# Patient Record
Sex: MALE | URBAN - METROPOLITAN AREA
[De-identification: names, ages, dates, MRNs, and addresses within clinical notes are randomized per-mention and may not be internally consistent; named-entity substitution may affect disease eponyms.]

---

## 2024-05-22 ENCOUNTER — TRANSCRIBE ORDERS (OUTPATIENT)
Dept: PHYSICAL THERAPY | Facility: CLINIC | Age: 64
End: 2024-05-22

## 2024-05-22 DIAGNOSIS — M25.552 LEFT HIP PAIN: ICD-10-CM

## 2024-05-22 DIAGNOSIS — M54.59 OTHER LOW BACK PAIN: Primary | ICD-10-CM

## 2024-08-22 ENCOUNTER — HOSPITAL ENCOUNTER (EMERGENCY)
Facility: HOSPITAL | Age: 64
Discharge: HOME | End: 2024-08-22
Attending: STUDENT IN AN ORGANIZED HEALTH CARE EDUCATION/TRAINING PROGRAM
Payer: COMMERCIAL

## 2024-08-22 ENCOUNTER — APPOINTMENT (OUTPATIENT)
Dept: GENERAL RADIOLOGY | Facility: HOSPITAL | Age: 64
End: 2024-08-22
Payer: COMMERCIAL

## 2024-08-22 VITALS
SYSTOLIC BLOOD PRESSURE: 150 MMHG | HEIGHT: 71 IN | HEART RATE: 81 BPM | TEMPERATURE: 97.5 F | OXYGEN SATURATION: 94 % | WEIGHT: 230 LBS | RESPIRATION RATE: 18 BRPM | DIASTOLIC BLOOD PRESSURE: 90 MMHG | BODY MASS INDEX: 32.2 KG/M2

## 2024-08-22 DIAGNOSIS — M25.552 BILATERAL HIP PAIN: Primary | ICD-10-CM

## 2024-08-22 DIAGNOSIS — M25.551 BILATERAL HIP PAIN: Primary | ICD-10-CM

## 2024-08-22 PROCEDURE — 99283 EMERGENCY DEPT VISIT LOW MDM: CPT

## 2024-08-22 PROCEDURE — 25010000002 DEXAMETHASONE SODIUM PHOSPHATE 10 MG/ML SOLUTION: Performed by: NURSE PRACTITIONER

## 2024-08-22 PROCEDURE — 73522 X-RAY EXAM HIPS BI 3-4 VIEWS: CPT

## 2024-08-22 PROCEDURE — 25010000002 KETOROLAC TROMETHAMINE PER 15 MG: Performed by: NURSE PRACTITIONER

## 2024-08-22 PROCEDURE — 96372 THER/PROPH/DIAG INJ SC/IM: CPT

## 2024-08-22 RX ORDER — KETOROLAC TROMETHAMINE 30 MG/ML
30 INJECTION, SOLUTION INTRAMUSCULAR; INTRAVENOUS ONCE
Status: COMPLETED | OUTPATIENT
Start: 2024-08-22 | End: 2024-08-22

## 2024-08-22 RX ORDER — DEXAMETHASONE SODIUM PHOSPHATE 10 MG/ML
10 INJECTION, SOLUTION INTRAMUSCULAR; INTRAVENOUS ONCE
Status: COMPLETED | OUTPATIENT
Start: 2024-08-22 | End: 2024-08-22

## 2024-08-22 RX ORDER — ATORVASTATIN CALCIUM 20 MG/1
20 TABLET, FILM COATED ORAL EVERY EVENING
COMMUNITY
Start: 2024-05-18

## 2024-08-22 RX ADMIN — KETOROLAC TROMETHAMINE 30 MG: 30 INJECTION, SOLUTION INTRAMUSCULAR; INTRAVENOUS at 16:17

## 2024-08-22 RX ADMIN — DEXAMETHASONE SODIUM PHOSPHATE 10 MG: 10 INJECTION INTRAMUSCULAR; INTRAVENOUS at 16:19

## 2024-08-22 NOTE — ED PROVIDER NOTES
"Subjective:  History of Present Illness:    Patient is a 64-year-old male without contributing health history.  Presents to the ER today with bilateral hip pain.  Patient denies injury.  Reports that he had imaging completed on his hips previously but was told that he had mild degenerative issues.  Tried some NSAIDs but was not effective and helping his pain.  He denies OTC medication home remedy.  Denies alleviating or exacerbating factors    Nurses Notes reviewed and agree, including vitals, allergies, social history and prior medical history.     REVIEW OF SYSTEMS: All systems reviewed and not pertinent unless noted.  Review of Systems   Musculoskeletal:  Positive for arthralgias.   All other systems reviewed and are negative.      Past Medical History:   Diagnosis Date    Hyperlipidemia        Allergies:    Penicillins      History reviewed. No pertinent surgical history.      Social History     Socioeconomic History    Marital status:    Tobacco Use    Smoking status: Never   Substance and Sexual Activity    Alcohol use: Yes    Drug use: Never         History reviewed. No pertinent family history.    Objective  Physical Exam:  /90 (BP Location: Left arm, Patient Position: Sitting)   Pulse 81   Temp 97.5 °F (36.4 °C) (Oral)   Resp 18   Ht 180.3 cm (71\")   Wt 104 kg (230 lb)   SpO2 94%   BMI 32.08 kg/m²      Physical Exam  Vitals and nursing note reviewed.   Constitutional:       Appearance: Normal appearance. He is normal weight.   HENT:      Head: Normocephalic and atraumatic.      Nose: Nose normal.      Mouth/Throat:      Mouth: Mucous membranes are moist.      Pharynx: Oropharynx is clear.   Eyes:      Extraocular Movements: Extraocular movements intact.      Conjunctiva/sclera: Conjunctivae normal.      Pupils: Pupils are equal, round, and reactive to light.   Cardiovascular:      Rate and Rhythm: Normal rate and regular rhythm.      Pulses: Normal pulses.      Heart sounds: Normal " heart sounds.   Pulmonary:      Effort: Pulmonary effort is normal.      Breath sounds: Normal breath sounds.   Abdominal:      General: Abdomen is flat. Bowel sounds are normal.      Palpations: Abdomen is soft.   Musculoskeletal:         General: Normal range of motion.      Cervical back: Normal range of motion and neck supple.   Skin:     General: Skin is warm and dry.      Capillary Refill: Capillary refill takes less than 2 seconds.   Neurological:      General: No focal deficit present.      Mental Status: He is alert and oriented to person, place, and time. Mental status is at baseline.   Psychiatric:         Mood and Affect: Mood normal.         Behavior: Behavior normal.         Thought Content: Thought content normal.         Judgment: Judgment normal.         Procedures    ED Course:    ED Course as of 08/22/24 1722   Thu Aug 22, 2024   1701 Plain films independently interpreted by me showing no concern for acute fracture [JE]      ED Course User Index  [JE] Josue Marquis MD       Lab Results (last 24 hours)       ** No results found for the last 24 hours. **             XR Hips Bilateral With or Without Pelvis 3-4 View    Result Date: 8/22/2024  PROCEDURE: XR HIPS BILATERAL W OR WO PELVIS 3-4 VIEW-  HISTORY: Bilateral hip pain  COMPARISON: None.  FINDINGS:  A two view exam demonstrates no acute fracture or dislocation. The joint spaces demonstrate mild narrowing of the hip joints bilaterally consistent with mild degenerative change. No soft tissue abnormality is seen.      Impression: No acute bony abnormality.      This report was signed and finalized on 8/22/2024 4:29 PM by Marifer Quintero MD.          MDM      Initial impression of presenting illness: Patient is a 64-year-old male without contributing health history.  Presents to the ER today with bilateral hip pain.  Patient denies injury.  Reports that he had imaging completed on his hips previously but was told that he had mild degenerative  issues.  Tried some NSAIDs but was not effective and helping his pain.  He denies OTC medication home remedy.  Denies alleviating or exacerbating factors    DDX: includes but is not limited to: Arthritis, strain, sprain, or other    Patient arrives stable with vitals interpreted by myself.     Pertinent features from physical exam: Unremarkable.    Initial diagnostic plan: X-ray of bilateral hip    Results from initial plan were reviewed and interpreted by me revealing x-ray of bilateral hips with following impression no acute bony abnormality.  Mild degenerative change noted.    Diagnostic information from other sources: Chart review    Interventions / Re-evaluation: Vital signs stable throughout encounter.  Patient received 10 mg dexamethasone.  30 mg Toradol.    Results/clinical rationale were discussed with patient    Consultations/Discussion of results with other physicians: N/A    Disposition plan: Patient follow-up with PCP in 1 week.  Follow-up ER for new or worsening symptoms.  Follow-up with orthopedist in 1 week.  Follow-up ER for new or worse symptoms.  -----        Final diagnoses:   Bilateral hip pain          Deejay King, APRN  08/22/24 4831

## 2025-01-09 ENCOUNTER — HOSPITAL ENCOUNTER (EMERGENCY)
Facility: HOSPITAL | Age: 65
Discharge: HOME | End: 2025-01-09
Attending: STUDENT IN AN ORGANIZED HEALTH CARE EDUCATION/TRAINING PROGRAM
Payer: MEDICAID

## 2025-01-09 VITALS
HEART RATE: 92 BPM | OXYGEN SATURATION: 96 % | HEIGHT: 71 IN | BODY MASS INDEX: 30.8 KG/M2 | DIASTOLIC BLOOD PRESSURE: 106 MMHG | RESPIRATION RATE: 18 BRPM | WEIGHT: 220 LBS | SYSTOLIC BLOOD PRESSURE: 165 MMHG | TEMPERATURE: 97.5 F

## 2025-01-09 DIAGNOSIS — F10.920 ALCOHOLIC INTOXICATION WITHOUT COMPLICATION: Primary | ICD-10-CM

## 2025-01-09 DIAGNOSIS — R45.851 PASSIVE SUICIDAL IDEATIONS: ICD-10-CM

## 2025-01-09 LAB
ALBUMIN SERPL-MCNC: 4.4 G/DL (ref 3.5–5.2)
ALBUMIN/GLOB SERPL: 1.3 G/DL
ALP SERPL-CCNC: 98 U/L (ref 39–117)
ALT SERPL W P-5'-P-CCNC: 33 U/L (ref 1–41)
AMPHET+METHAMPHET UR QL: NEGATIVE
AMPHETAMINES UR QL: NEGATIVE
ANION GAP SERPL CALCULATED.3IONS-SCNC: 14.3 MMOL/L (ref 5–15)
APAP SERPL-MCNC: <5 MCG/ML (ref 0–30)
AST SERPL-CCNC: 33 U/L (ref 1–40)
BARBITURATES UR QL SCN: NEGATIVE
BASOPHILS # BLD AUTO: 0.08 10*3/MM3 (ref 0–0.2)
BASOPHILS NFR BLD AUTO: 1.2 % (ref 0–1.5)
BENZODIAZ UR QL SCN: NEGATIVE
BILIRUB SERPL-MCNC: 0.3 MG/DL (ref 0–1.2)
BILIRUB UR QL STRIP: NEGATIVE
BUN SERPL-MCNC: 5 MG/DL (ref 8–23)
BUN/CREAT SERPL: 5.4 (ref 7–25)
BUPRENORPHINE SERPL-MCNC: NEGATIVE NG/ML
CALCIUM SPEC-SCNC: 9.4 MG/DL (ref 8.6–10.5)
CANNABINOIDS SERPL QL: NEGATIVE
CHLORIDE SERPL-SCNC: 105 MMOL/L (ref 98–107)
CLARITY UR: CLEAR
CO2 SERPL-SCNC: 19.7 MMOL/L (ref 22–29)
COCAINE UR QL: NEGATIVE
COLOR UR: YELLOW
CREAT SERPL-MCNC: 0.92 MG/DL (ref 0.76–1.27)
DEPRECATED RDW RBC AUTO: 46.9 FL (ref 37–54)
EGFRCR SERPLBLD CKD-EPI 2021: 92.9 ML/MIN/1.73
EOSINOPHIL # BLD AUTO: 0.4 10*3/MM3 (ref 0–0.4)
EOSINOPHIL NFR BLD AUTO: 5.8 % (ref 0.3–6.2)
ERYTHROCYTE [DISTWIDTH] IN BLOOD BY AUTOMATED COUNT: 13.3 % (ref 12.3–15.4)
ETHANOL BLD-MCNC: 119 MG/DL (ref 0–10)
ETHANOL BLD-MCNC: 139 MG/DL (ref 0–10)
ETHANOL UR QL: 0.12 %
ETHANOL UR QL: 0.14 %
FENTANYL UR-MCNC: NEGATIVE NG/ML
GLOBULIN UR ELPH-MCNC: 3.5 GM/DL
GLUCOSE SERPL-MCNC: 93 MG/DL (ref 65–99)
GLUCOSE UR STRIP-MCNC: NEGATIVE MG/DL
HCT VFR BLD AUTO: 49.4 % (ref 37.5–51)
HGB BLD-MCNC: 16.8 G/DL (ref 13–17.7)
HGB UR QL STRIP.AUTO: NEGATIVE
HOLD SPECIMEN: NORMAL
HOLD SPECIMEN: NORMAL
IMM GRANULOCYTES # BLD AUTO: 0.02 10*3/MM3 (ref 0–0.05)
IMM GRANULOCYTES NFR BLD AUTO: 0.3 % (ref 0–0.5)
KETONES UR QL STRIP: NEGATIVE
LEUKOCYTE ESTERASE UR QL STRIP.AUTO: NEGATIVE
LYMPHOCYTES # BLD AUTO: 2.78 10*3/MM3 (ref 0.7–3.1)
LYMPHOCYTES NFR BLD AUTO: 40.5 % (ref 19.6–45.3)
MAGNESIUM SERPL-MCNC: 1.9 MG/DL (ref 1.6–2.4)
MCH RBC QN AUTO: 32.1 PG (ref 26.6–33)
MCHC RBC AUTO-ENTMCNC: 34 G/DL (ref 31.5–35.7)
MCV RBC AUTO: 94.5 FL (ref 79–97)
METHADONE UR QL SCN: NEGATIVE
MONOCYTES # BLD AUTO: 0.63 10*3/MM3 (ref 0.1–0.9)
MONOCYTES NFR BLD AUTO: 9.2 % (ref 5–12)
NEUTROPHILS NFR BLD AUTO: 2.96 10*3/MM3 (ref 1.7–7)
NEUTROPHILS NFR BLD AUTO: 43 % (ref 42.7–76)
NITRITE UR QL STRIP: NEGATIVE
NRBC BLD AUTO-RTO: 0 /100 WBC (ref 0–0.2)
OPIATES UR QL: NEGATIVE
OXYCODONE UR QL SCN: NEGATIVE
PCP UR QL SCN: NEGATIVE
PH UR STRIP.AUTO: <=5 [PH] (ref 5–8)
PLATELET # BLD AUTO: 343 10*3/MM3 (ref 140–450)
PMV BLD AUTO: 8.4 FL (ref 6–12)
POTASSIUM SERPL-SCNC: 4.3 MMOL/L (ref 3.5–5.2)
PROT SERPL-MCNC: 7.9 G/DL (ref 6–8.5)
PROT UR QL STRIP: NEGATIVE
RBC # BLD AUTO: 5.23 10*6/MM3 (ref 4.14–5.8)
SALICYLATES SERPL-MCNC: <0.3 MG/DL
SODIUM SERPL-SCNC: 139 MMOL/L (ref 136–145)
SP GR UR STRIP: <=1.005 (ref 1–1.03)
TRICYCLICS UR QL SCN: NEGATIVE
TSH SERPL DL<=0.05 MIU/L-ACNC: 2.03 UIU/ML (ref 0.27–4.2)
UROBILINOGEN UR QL STRIP: NORMAL
WBC NRBC COR # BLD AUTO: 6.87 10*3/MM3 (ref 3.4–10.8)
WHOLE BLOOD HOLD COAG: NORMAL
WHOLE BLOOD HOLD SPECIMEN: NORMAL

## 2025-01-09 PROCEDURE — 81003 URINALYSIS AUTO W/O SCOPE: CPT | Performed by: STUDENT IN AN ORGANIZED HEALTH CARE EDUCATION/TRAINING PROGRAM

## 2025-01-09 PROCEDURE — 82077 ASSAY SPEC XCP UR&BREATH IA: CPT | Performed by: STUDENT IN AN ORGANIZED HEALTH CARE EDUCATION/TRAINING PROGRAM

## 2025-01-09 PROCEDURE — 93005 ELECTROCARDIOGRAM TRACING: CPT | Performed by: STUDENT IN AN ORGANIZED HEALTH CARE EDUCATION/TRAINING PROGRAM

## 2025-01-09 PROCEDURE — 80053 COMPREHEN METABOLIC PANEL: CPT | Performed by: STUDENT IN AN ORGANIZED HEALTH CARE EDUCATION/TRAINING PROGRAM

## 2025-01-09 PROCEDURE — 80143 DRUG ASSAY ACETAMINOPHEN: CPT | Performed by: STUDENT IN AN ORGANIZED HEALTH CARE EDUCATION/TRAINING PROGRAM

## 2025-01-09 PROCEDURE — 82077 ASSAY SPEC XCP UR&BREATH IA: CPT

## 2025-01-09 PROCEDURE — 80179 DRUG ASSAY SALICYLATE: CPT | Performed by: STUDENT IN AN ORGANIZED HEALTH CARE EDUCATION/TRAINING PROGRAM

## 2025-01-09 PROCEDURE — 84443 ASSAY THYROID STIM HORMONE: CPT | Performed by: STUDENT IN AN ORGANIZED HEALTH CARE EDUCATION/TRAINING PROGRAM

## 2025-01-09 PROCEDURE — 99285 EMERGENCY DEPT VISIT HI MDM: CPT | Performed by: STUDENT IN AN ORGANIZED HEALTH CARE EDUCATION/TRAINING PROGRAM

## 2025-01-09 PROCEDURE — 85025 COMPLETE CBC W/AUTO DIFF WBC: CPT | Performed by: STUDENT IN AN ORGANIZED HEALTH CARE EDUCATION/TRAINING PROGRAM

## 2025-01-09 PROCEDURE — 83735 ASSAY OF MAGNESIUM: CPT | Performed by: STUDENT IN AN ORGANIZED HEALTH CARE EDUCATION/TRAINING PROGRAM

## 2025-01-09 PROCEDURE — 80307 DRUG TEST PRSMV CHEM ANLYZR: CPT | Performed by: STUDENT IN AN ORGANIZED HEALTH CARE EDUCATION/TRAINING PROGRAM

## 2025-01-09 PROCEDURE — 36415 COLL VENOUS BLD VENIPUNCTURE: CPT

## 2025-01-09 NOTE — ED PROVIDER NOTES
" EMERGENCY DEPARTMENT ENCOUNTER    Pt Name: Bethel Velazquez  MRN: [id number]  Pt :   1960  Room Number:  CDU1/01  Date of encounter:  2025  PCP: Provider, No Known  ED Provider: Elkin Barnes PA-C    Historian: patient, nursing notes      HPI:  Chief Complaint: Alcohol intoxication, SI        Context: Bethel Velazquez is a 64 y.o. male who presents to the ED c/o suicidal thoughts over the past 1 week.  Patient states he is also been drinking alcohol daily to try to help himself sleep.  Patient states he does not have a direct plan of how he would commit suicide and states emphatically that he \"would never really do that to myself\".  Patient just states that he has been exceedingly depressed as he got  recently moved to this area and his son  2 years ago.      PAST MEDICAL HISTORY  Past Medical History:   Diagnosis Date    Hyperlipidemia          PAST SURGICAL HISTORY  History reviewed. No pertinent surgical history.      FAMILY HISTORY  History reviewed. No pertinent family history.      SOCIAL HISTORY  Social History     Socioeconomic History    Marital status:    Tobacco Use    Smoking status: Never   Substance and Sexual Activity    Alcohol use: Yes     Alcohol/week: 50.0 standard drinks of alcohol     Types: 50 Glasses of wine per week     Comment: drinks 6-12 beers daily    Drug use: Never         ALLERGIES  Penicillins        REVIEW OF SYSTEMS  Review of Systems   Constitutional:  Negative for chills and fever.   HENT:  Negative for congestion and sore throat.    Respiratory:  Negative for cough and shortness of breath.    Cardiovascular:  Negative for chest pain.   Gastrointestinal:  Negative for abdominal pain, nausea and vomiting.   Genitourinary:  Negative for dysuria.   Musculoskeletal:  Negative for back pain.   Skin:  Negative for wound.   Neurological:  Negative for dizziness and headaches.   Psychiatric/Behavioral:  Positive for sleep disturbance and suicidal ideas. " Negative for confusion. The patient is nervous/anxious.    All other systems reviewed and are negative.         All systems reviewed and negative except for those discussed in HPI.       PHYSICAL EXAM    I have reviewed the triage vital signs and nursing notes.    ED Triage Vitals [01/09/25 1702]   Temp Heart Rate Resp BP SpO2   97.5 °F (36.4 °C) 90 19 (!) 151/105 92 %      Temp src Heart Rate Source Patient Position BP Location FiO2 (%)   -- -- -- -- --       Physical Exam  Vitals and nursing note reviewed.   Constitutional:       General: He is not in acute distress.     Appearance: Normal appearance. He is not ill-appearing, toxic-appearing or diaphoretic.   HENT:      Head: Normocephalic and atraumatic.      Right Ear: External ear normal.      Left Ear: External ear normal.      Nose: No congestion or rhinorrhea.      Mouth/Throat:      Mouth: Mucous membranes are moist.      Pharynx: Oropharynx is clear.   Eyes:      Conjunctiva/sclera: Conjunctivae normal.   Cardiovascular:      Rate and Rhythm: Normal rate.      Heart sounds: Normal heart sounds.   Pulmonary:      Effort: Pulmonary effort is normal. No respiratory distress.      Breath sounds: Normal breath sounds. No wheezing.   Abdominal:      Tenderness: There is no abdominal tenderness.   Musculoskeletal:      Cervical back: Normal range of motion and neck supple.      Right lower leg: No edema.      Left lower leg: No edema.   Skin:     Findings: No rash.   Neurological:      Mental Status: He is alert.   Psychiatric:         Thought Content: Thought content includes suicidal ideation. Thought content does not include homicidal ideation. Thought content does not include homicidal or suicidal plan.             LAB RESULTS  Recent Results (from the past 24 hours)   Comprehensive Metabolic Panel    Collection Time: 01/09/25  5:23 PM    Specimen: Blood   Result Value Ref Range    Glucose 93 65 - 99 mg/dL    BUN 5 (L) 8 - 23 mg/dL    Creatinine 0.92 0.76 -  1.27 mg/dL    Sodium 139 136 - 145 mmol/L    Potassium 4.3 3.5 - 5.2 mmol/L    Chloride 105 98 - 107 mmol/L    CO2 19.7 (L) 22.0 - 29.0 mmol/L    Calcium 9.4 8.6 - 10.5 mg/dL    Total Protein 7.9 6.0 - 8.5 g/dL    Albumin 4.4 3.5 - 5.2 g/dL    ALT (SGPT) 33 1 - 41 U/L    AST (SGOT) 33 1 - 40 U/L    Alkaline Phosphatase 98 39 - 117 U/L    Total Bilirubin 0.3 0.0 - 1.2 mg/dL    Globulin 3.5 gm/dL    A/G Ratio 1.3 g/dL    BUN/Creatinine Ratio 5.4 (L) 7.0 - 25.0    Anion Gap 14.3 5.0 - 15.0 mmol/L    eGFR 92.9 >60.0 mL/min/1.73   Magnesium    Collection Time: 01/09/25  5:23 PM    Specimen: Blood   Result Value Ref Range    Magnesium 1.9 1.6 - 2.4 mg/dL   Acetaminophen Level    Collection Time: 01/09/25  5:23 PM    Specimen: Blood   Result Value Ref Range    Acetaminophen <5.0 0.0 - 30.0 mcg/mL   Ethanol    Collection Time: 01/09/25  5:23 PM    Specimen: Blood   Result Value Ref Range    Ethanol 139 (H) 0 - 10 mg/dL    Ethanol % 0.139 %   Salicylate Level    Collection Time: 01/09/25  5:23 PM    Specimen: Blood   Result Value Ref Range    Salicylate <0.3 <=30.0 mg/dL   Urinalysis With Microscopic If Indicated (No Culture) - Urine, Clean Catch    Collection Time: 01/09/25  5:23 PM    Specimen: Urine, Clean Catch   Result Value Ref Range    Color, UA Yellow Yellow, Straw    Appearance, UA Clear Clear    pH, UA <=5.0 5.0 - 8.0    Specific Gravity, UA <=1.005 1.005 - 1.030    Glucose, UA Negative Negative    Ketones, UA Negative Negative    Bilirubin, UA Negative Negative    Blood, UA Negative Negative    Protein, UA Negative Negative    Leuk Esterase, UA Negative Negative    Nitrite, UA Negative Negative    Urobilinogen, UA 0.2 E.U./dL 0.2 - 1.0 E.U./dL   Urine Drug Screen - Urine, Clean Catch    Collection Time: 01/09/25  5:23 PM    Specimen: Urine, Clean Catch   Result Value Ref Range    THC, Screen, Urine Negative Negative    Phencyclidine (PCP), Urine Negative Negative    Cocaine Screen, Urine Negative Negative     Methamphetamine, Ur Negative Negative    Opiate Screen Negative Negative    Amphetamine Screen, Urine Negative Negative    Benzodiazepine Screen, Urine Negative Negative    Tricyclic Antidepressants Screen Negative Negative    Methadone Screen, Urine Negative Negative    Barbiturates Screen, Urine Negative Negative    Oxycodone Screen, Urine Negative Negative    Buprenorphine, Screen, Urine Negative Negative   TSH    Collection Time: 01/09/25  5:23 PM    Specimen: Blood   Result Value Ref Range    TSH 2.030 0.270 - 4.200 uIU/mL   Green Top (Gel)    Collection Time: 01/09/25  5:23 PM   Result Value Ref Range    Extra Tube Hold for add-ons.    Lavender Top    Collection Time: 01/09/25  5:23 PM   Result Value Ref Range    Extra Tube hold for add-on    Gold Top - SST    Collection Time: 01/09/25  5:23 PM   Result Value Ref Range    Extra Tube Hold for add-ons.    Light Blue Top    Collection Time: 01/09/25  5:23 PM   Result Value Ref Range    Extra Tube Hold for add-ons.    CBC Auto Differential    Collection Time: 01/09/25  5:23 PM    Specimen: Blood   Result Value Ref Range    WBC 6.87 3.40 - 10.80 10*3/mm3    RBC 5.23 4.14 - 5.80 10*6/mm3    Hemoglobin 16.8 13.0 - 17.7 g/dL    Hematocrit 49.4 37.5 - 51.0 %    MCV 94.5 79.0 - 97.0 fL    MCH 32.1 26.6 - 33.0 pg    MCHC 34.0 31.5 - 35.7 g/dL    RDW 13.3 12.3 - 15.4 %    RDW-SD 46.9 37.0 - 54.0 fl    MPV 8.4 6.0 - 12.0 fL    Platelets 343 140 - 450 10*3/mm3    Neutrophil % 43.0 42.7 - 76.0 %    Lymphocyte % 40.5 19.6 - 45.3 %    Monocyte % 9.2 5.0 - 12.0 %    Eosinophil % 5.8 0.3 - 6.2 %    Basophil % 1.2 0.0 - 1.5 %    Immature Grans % 0.3 0.0 - 0.5 %    Neutrophils, Absolute 2.96 1.70 - 7.00 10*3/mm3    Lymphocytes, Absolute 2.78 0.70 - 3.10 10*3/mm3    Monocytes, Absolute 0.63 0.10 - 0.90 10*3/mm3    Eosinophils, Absolute 0.40 0.00 - 0.40 10*3/mm3    Basophils, Absolute 0.08 0.00 - 0.20 10*3/mm3    Immature Grans, Absolute 0.02 0.00 - 0.05 10*3/mm3    nRBC 0.0 0.0 -  0.2 /100 WBC   Fentanyl, Urine - Urine, Clean Catch    Collection Time: 01/09/25  5:23 PM    Specimen: Urine, Clean Catch   Result Value Ref Range    Fentanyl, Urine Negative Negative   Ethanol    Collection Time: 01/09/25  6:55 PM    Specimen: Blood   Result Value Ref Range    Ethanol 119 (H) 0 - 10 mg/dL    Ethanol % 0.119 %       If labs were ordered, I independently reviewed the results and considered them in treating the patient.        RADIOLOGY  No Radiology Exams Resulted Within Past 24 Hours      PROCEDURES    Procedures    ECG 12 Lead Other; psych   Final Result          MEDICATIONS GIVEN IN ER    Medications - No data to display      MEDICAL DECISION MAKING, PROGRESS, and CONSULTS    All labs, if obtained, have been independently reviewed by me.  All radiology studies, if obtained, have been reviewed by me and the radiologist dictating the report.  All EKG's, if obtained, have been independently viewed and interpreted by me/my attending physician.      Discussion below represents my analysis of pertinent findings related to patient's condition, differential diagnosis, treatment plan and final disposition.  64-year-old male who presented to the ER for evaluation of reported suicidal ideation after he called a local hotline.  Today the patient appears to be mildly intoxicated but he is alert and oriented and his vitals and pulse oximetry show mild hypertension otherwise unremarkable.  My physical exam is  unremarkable.  Extensive laboratory workup EKG and urinalysis was ordered.    Laboratory studies were reassuring his ethanol initially tested was 139.  Ethanol retested later to be 119 and now behavioral health assessment was performed at bedside by Reji ANTONY.  Reji felt that the patient was stable for discharge as he was now emphatically saying he is not actively suicidal has no acute plan of suicide denies HI and would like to be discharged.  The patient now appears clinically sober and has a  ride home.  The professional opinion of Reji ANTONY that this patient was safe to be discharged with an action plan and close referral with psychiatrist plus the patient will be in contact with his own therapist tomorrow.  I therefore discharge the patient based on this advise home.                       Differential diagnosis:    Differential diagnosis included but was not limited to SI, anxiety, depression, alcohol intoxication      Additional sources:    - Discussed/ obtained information from independent historians: None    - External (non-ED) record review: Primary care records and previous medical records reviewed    - Chronic or social conditions impacting care: None    Orders placed during this visit:  Orders Placed This Encounter   Procedures    [place] Draw    Comprehensive Metabolic Panel    Magnesium    Acetaminophen Level    Ethanol    Salicylate Level    Urinalysis With Microscopic If Indicated (No Culture) - Urine, Clean Catch    Urine Drug Screen - Urine, Clean Catch    TSH    CBC Auto Differential    Fentanyl, Urine - Urine, Clean Catch    Ethanol    Vital Signs    Undress & Gown    Psych / Access to See    ECG 12 Lead Other; psych    Suicide Precautions    CBC & Differential    Green Top (Gel)    Lavender Top    Gold Top - SST    Light Blue Top         Additional orders considered but not ordered: None       ED Course:    Consultants:  behavioral health consultant Reji ANTONY    ED Course as of 01/09/25 2134   Thu Jan 09, 2025   1815 Ethanol(!): 139 [TG]      ED Course User Index  [TG] Elkin Barnes PA-C              Shared Decision Making:  After my consideration of clinical presentation and any laboratory/radiology studies obtained, I discussed the findings with the patient/patient representative who is in agreement with the treatment plan and the final disposition.   Risks and benefits of discharge and/or observation/admission were discussed.       AS OF 21:34 EST VITALS:    BP -  (!) 165/106  HR - 92  TEMP - 97.5 °F (36.4 °C)  O2 SATS - 96%                  DIAGNOSIS  Final diagnoses:   Alcoholic intoxication without complication   Passive suicidal ideations         DISPOSITION  Discharge home      Please note that portions of this document were completed with voice recognition software.      Elkin Barnes PA-C  01/09/25 [id number]

## 2025-01-10 NOTE — DISCHARGE INSTRUCTIONS
We recommend calling your therapist tomorrow and following up with the behavioral health resources that were provided to you today.  We recommend against drinking alcohol using any drugs and we also recommend you return to the ER immediately should you have increased or worsening depression symptoms, anxiety, or specially for any thoughts of wanting to hurt yourself.

## 2025-01-10 NOTE — CONSULTS
"Bethel Marcus  1960      Race/Ethnicity: White or   Martial Status:   Guardian Name/Contact/etc: Self  Pt Lives With:  Self  Occupation: retired  Appearance: Patient is dressed appropriately      Time Called for Assessment: 19:36  Assessment Start and End: 19:38-20:08      DATA:   Clinician received a call from [facility] staff for a behavioral health consult.  The patient is agreeable to speak with the behavioral health team.  Met with patient at bedside. Patient is under 1:1 security monitoring.  The attending treatment team is Elisabeth Chairez RN and Elkin Barnes PA-C, Provider.  Patient presents today with chief compliant of anxiety and depression.  Clinician completed assessment with patient and observations are documented as follows.    ASSESSMENT:    Clinician consulted with patient for mental status exam and assessment.  Clinical descriptors are documented as follows.  Clinician completed CSSRS with patient for suicide risk assessment.  The results of patient’s CSSRS documented as follows.    Presenting Problems: Patient reported that a week ago he contacted the [phone] number to be able to talk to someone and start \" getting help\" . Patient reported that they set him up with an appointment with a therapist in [place] (patient could not remember the name of the therapist or place). Patient reported that he went and saw the therapist and then was told he would have a Psychiatry appointment. Patient reported that appointment did not occur so he called the therapist back about it. Patient reported that the therapist started focusing on his alcohol use and patient got upset and told therapist he would handle it himself. Patient reported that he was sitting on his couch drinking a beer and [facility] showed up and he was brought to ER for evaluation. Therapist asked patient what he meant by \" I will handle it myself\" Patient reported, \" I will just get over and work on it\". Patient was " Report given to ADRIAN Resendiz.    convincingly adamant in denying suicidal thoughts, plans or intent. Patient reports that he is sad and anxious due to his recent separation with his wife of 34 years and continued grieving of his sons passing in 2022. Patient reported having supports in the area and protective factors. Patient reported he was just wanting to get services started.     Current Stressors: Marital Relationship, mental health , grief     Established Therapy, Medication Management or Other Mental Health Services: Started services with a place in [place] that was set up by [facility] but could not remember the name. Patient is wanting a referral to [facility] [place] for outpatient services.    Current Psychiatric Medications: None at this time       Mental Status Exam:  Behavior: Appropriate  Psychomotor Movement: Appropriate  Attention and Cooperation: Normal and Cooperative  Mood: appropriate to circumstances and Affect: Appropriate  Orientation: alert and oriented to person, place, and time   Thought Process: linear, logical, and goal directed  Thought Content: normal  Delusions: none   Hallucinations: None and Not demonstrated today   Concentration: Normal  Suicidal Ideation: Absent  Homicidal Ideation: Absent  Hopelessness: Mild  Speech: Normal  Eye Contact: Good  Insight: Good  Judgement: Good    Depression: 8   Anxiety: 8  Sleep: Fair   Appetite: Tolerating diet       Hx of Psychiatric or Detox Hospitalizations: None  Most recent inpatient admission: N/A    Suicidal Ideation Assessment:    [place]-SUICIDE SEVERITY RATING SCALE  Psychiatric Inpatient Setting - Discharge Screener    Ask questions that are bold and underlined Discharge   Ask Questions 1 and 2 YES NO   Wish to be Dead:   Person endorses thoughts about a wish to be dead or not alive anymore, or wish to fall asleep and not wake up.  While you were here in the hospital, have you wished you were dead or wished you could go to sleep and not wake up?  X   Suicidal Thoughts:   General  non-specific thoughts of wanting to end one's life/die by suicide, “I've thought about killing myself” without general thoughts of ways to kill oneself/associated methods, intent, or plan.   While you were here in the hospital, have you actually had thoughts about killing yourself?   X   If YES to 2, ask questions 3, 4, 5, and 6.  If NO to 2, go directly to question 6   3) Suicidal Thoughts with Method (without Specific Plan or Intent to Act):   Person endorses thoughts of suicide and has thought of a least one method during the assessment period. This is different than a specific plan with time, place or method details worked out. “I thought about taking an overdose but I never made a specific plan as to when where or how I would actually do it….and I would never go through with it.”   Have you been thinking about how you might kill yourself?      4) Suicidal Intent (without Specific Plan):   Active suicidal thoughts of killing oneself and patient reports having some intent to act on such thoughts, as opposed to “I have the thoughts but I definitely will not do anything about them.”   Have you had these thoughts and had some intention of acting on them or do you have some intention of acting on them after you leave the hospital?      5) Suicide Intent with Specific Plan:   Thoughts of killing oneself with details of plan fully or partially worked out and person has some intent to carry it out.   Have you started to work out or worked out the details of how to kill yourself either for while you were here in the hospital or for after you leave the hospital? Do you intend to carry out this plan?        6) Suicide Behavior    While you were here in the hospital, have you done anything, started to do anything, or prepared to do anything to end your life?    Examples: Took pills, cut yourself, tried to hang yourself, took out pills but didn't swallow any because you changed your mind or someone took them from you,  collected pills, secured a means of obtaining a gun, gave away valuables, wrote a will or suicide note, etc.  X     Suicidal: Patient was convincingly adamant in denying suicidal thoughts, plans or intent. Patient reports that he is sad and anxious due to his recent separation with his wife of 34 years and continued grieving of his sons passing in 2022. Patient reported having supports in the area and protective factors. Patient reported he was just wanting to get services started. Patient denies having any attempts or self injurious behaviors.  (If present, describe frequency of thoughts, patient's perception of impulse control, plan or behavior details, etc)  Previous Attempts: None  Most Recent Attempt: N/A    Psychosocial History  Family Hx of Mental Health/Substance Abuse: Patient did not disclose  Patient Trauma/Abuse History: Patient has trauma due to death of his son in 2022 . Patient did not disclose details.     Does this require reporting: No  Patient Identified Support System:  Sister in Law, Brother, Mother.     Legal History / History of Violence: Denies significant history of legal issues.   Experience with Interpersonal Violence: No  History of Inappropriate Sexual Behavior: No  Current Medical Conditions or Biomedical Complications: No     Social Determinants of Health  Housing Instability and/or Utility Needs: No  Food Insecurity: No  Transportation Needs: No    Substance Use History  Active Use: Patient does report actively drinking alcohol. Patient reported prior to moving to [place] he would only drink 6 beers on the weekend and sometimes 6 beers during the week. Patient reported for last 2 months he has been drinking 6-12 beers daily as a way to cope and go to sleep. Patient denies any other substance use.  UDS was negative, EtOH upon arrival 139 mg/dl at 19:28 119mg/dl. Patient was coherent alert and oriented.    Does the patient have history or current MAT/MOUD: No  Withdrawal Symptoms:  No  History of DT's: No  History of Seizures: No  Recovery Environment: None at this time          Clinical [place] Withdrawal Assessment of Alcohol Scale (CIWA)  Pulse or heart rate, taken for one minute: 92  Blood pressure:165/106    NAUSEA AND VOMITING--Ask “Do you fell sick to your stomach? Have you vomited?” Observatoin.  0 no nausea and no vomiting  1 mild nausea with no vomiting  2  3  4 intermittent nausea with dry heaves  5  6  7 constant nausea, frequent dry heaves and vomiting    TREMOR--Arms extended and fingers spread apart.  Observation  0 no tremor  1 not visible, but can be felt fingertip to fingertip  2  3  4 moderate, with patient's arms extended  5  6  7 severe, even with arms not extended    PAROXYSMAL SWEATS--Observation  0 no sweat visible  1 barely perceptible sweating, palms moist  2  3  4 beads of sweat obvious on forehead  5  6  7 drenching sweats    ANXIETY--Ask “Do you feel nervous?” Observation.  0 no anxiety, at ease  1 mild anxious  2  3  4 moderately anxious, or guarded, so anxiety is inferred  5  6  7 equivalent to acute panic states as seen in severe delirium or acute schizophrenic reactions    TACTILE DISTURBANCES--Ask “Have you any itching, pins and needles sensations, any burning, any numbness, or do you feel bugs crawling on or under your skin?” Observation.  0 none  1 very mild itching, pins and needles, burning or numbness  2 mild itching, pins and needles, burning or numbness  3 moderate itching, pins and needles, burning or numbness  4 moderately severe hallucinations  5 severe hallucinations  6 extremely severe hallucinations  7 continuous hallucinations  AUDITORY DISTURBANCES--Ask “Are you more aware of sounds around you ? Are they harsh? Do they frighten you?  Are you hearing anything that is disturbing to you?  Are you hearing things you know are not there? Observation.  0 not present  1 very mild harshness or ability to frighten  2 mild harshness or ability to  frighten  3 moderate harshness or ability to frighten  4 moderately severe hallucinations  5 severe hallucinations  6 extremely severe hallucinations  7 continuous hallucinations       VISUAL DISTURBANCES--Ask “Does the light appear to be too bright? Is its color different? Does it hurt your eyes?  Are you seeing anything that is disturbing to you? Are you seeing things you know are not there?' Observation  0 not present  1 very mild sensitivity  2 mild sensitivity  3 moderate sensitivity  4 moderately severe hallucinations  5 severe hallucinations  6 extremely severe hallucinations  7 continuous hallucinations    HEADACHE, FULLNESS IN HEAD--Ask “Does your head feel different? Does it feel like there is a band around your head?” Do not rate for dizziness or lightheadedness.  Otherwise, rate severity.  0 not present  1 very mild  2 mild  3 moderate  4 moderately severe  5 severe  6 very severe  7 extremely severe    AGITATION--Observation  0 normal activity  1 somewhat more than normal activity  2   3  4 moderately fidgety and restless  5  6  7 paces back and forth during most of the interview, or constantly thrashes about    ORIENTATION AND CLOUDING OF SENSORIUM--Ask   “What day is this? Where are you? Who am I?  0 oriented and can do serial additions  1 cannot do serial additions or is uncertain about date  2 disoriented for date by no more than 2 calendar days  3 disoriented for date by more than 2 calendar days  4 disoriented for place/or person    Total CIWA-Ar Score: 1: Anxiety   Rater's Initials: RENNY      PLAN:  At this time, clinician recommends for outpatient services. Although the patient has potential to benefit from the inpatient level of care, the patient is not agreeable for an acute admission at this time.  Patient does not present with criteria to warrant an involuntary psychiatric hold at this time as they are not endorsing active suicidal ideation with plan/intent, homicidal ideation with plan/intent  or displaying symptoms of an acute psychotic episode without ability to appropriately plan for safety at a lesser restrictive level of care.  The patient identified proactive factors and is agreeable to establish/engage in outpatient behavioral health services. Therapist discussed plan with [facility] Treatment team and team is agreeable for outpatient service. Therapist and patient discussed plan for outpatient services and patient is agreeable for referral to be sent to [facility]. Therapist and patient completed safety plan and patient was provided resources.    Have the levels of care been discussed with the patient? Yes  Level of care recommendation: outpatient  Is patient agreeable to treatment? Yes    Safety Planning:  Does the patient have access to weapons or firearms? No  Did clinician discuss securing weapons, firearms, sharps and/or medications with the patient? Yes  Safety Plan: Clinician verbally engaged in safety planning by assisting the patient in identifying risk factors that would indicate the need for higher level of care, such as thoughts to harm self or others and/or self-harming behavior(s). Safety plan of report to nearest hospital, calling local police or 911 if feeling unsafe, if having suicidal or homicidal thoughts, or if in emergent need of medications verbally reviewed with patient during assessment and suicide prevention/crisis hotlines verbally reviewed with patient during assessment. Patient during assessment verbally agreed to safety plan. Reviewed resources of crisis hotlines or presenting to the nearest emergency department should symptoms worsen, or in any crisis/emergency. Patient agreeable and voiced understanding.       SIGNATURE  Reji Mahoney [facility]  01/09/2025

## 2025-01-14 ENCOUNTER — OFFICE VISIT (OUTPATIENT)
Dept: PSYCHIATRY | Facility: CLINIC | Age: 65
End: 2025-01-14

## 2025-01-14 VITALS
HEART RATE: 78 BPM | BODY MASS INDEX: 34.72 KG/M2 | WEIGHT: 248 LBS | SYSTOLIC BLOOD PRESSURE: 140 MMHG | HEIGHT: 71 IN | DIASTOLIC BLOOD PRESSURE: 88 MMHG | OXYGEN SATURATION: 99 %

## 2025-01-14 DIAGNOSIS — F33.2 SEVERE EPISODE OF RECURRENT MAJOR DEPRESSIVE DISORDER, WITHOUT PSYCHOTIC FEATURES: ICD-10-CM

## 2025-01-14 DIAGNOSIS — F41.1 GENERALIZED ANXIETY DISORDER: ICD-10-CM

## 2025-01-14 DIAGNOSIS — F10.20 ALCOHOL USE DISORDER, SEVERE, DEPENDENCE: ICD-10-CM

## 2025-01-14 DIAGNOSIS — F41.1 GENERALIZED ANXIETY DISORDER: Primary | ICD-10-CM

## 2025-01-14 RX ORDER — NALTREXONE HYDROCHLORIDE 50 MG/1
50 TABLET, FILM COATED ORAL DAILY
Qty: 30 TABLET | Refills: 0 | Status: SHIPPED | OUTPATIENT
Start: 2025-01-14

## 2025-01-14 RX ORDER — NALTREXONE HYDROCHLORIDE 50 MG/1
50 TABLET, FILM COATED ORAL DAILY
Qty: 30 TABLET | Refills: 0 | Status: SHIPPED | OUTPATIENT
Start: 2025-01-14 | End: 2025-01-14 | Stop reason: SDUPTHER

## 2025-01-14 NOTE — PROGRESS NOTES
New Patient Office Visit        Patient Name: Bethel Velazquez  : 1960   MRN: [id number]     Referring Provider: Provider, No Known    Chief Complaint: Substance use and mental health    History of Present Illness:   Bethel Velazquez is a 64 y.o. male who is here today for initial evaluation with provider related to substance use and mental health.  Alcohol use and initially started in high school with social and seldom use.  After the death of his son in , use began to increase and be more regular.  Continued to increase over time and reached his peak 2024.  Was drinking 12 pack of beer most to all nights of the week.  Cut back some in recent past and has been drinking 6-12 beers most nights a week.  Brought to ER on 2025 by [employer] as he had made some concerning statements [facility].  Has not had any alcohol intake since.  Denies any other substance use.  Cravings for alcohol are multiple times daily.  Also having dreams about drinking at night.  Typically triggered by habit, hard emotions, and grief.  Has been in AA in the past.  No other previous FLORES/MAT. Identifies sober support system of his mother, brother, and sister-in-law.      Denies psych history.  Feels he has been struggling with mood off and on for at least the last few years.  Today reports symptoms of depressed mood overall, lack of motivation/interest, feelings of guilt, low energy, poor concentration, appetites waxes and wanes, sleep difficulties, and psychomotor retardation at times. Admits thoughts that he would be better off dead or wishes he doesn't wake up. Denies any plan or intent to harm himself. Also struggles with anxious mood overall, worries all the time, feels on edge/irritable, racing thoughts, trouble relaxing, and restlessness.  Finds most problematic symptom to be crying spells.  No previous pharmacological intervention in the past.  Previously enjoyed working on cars and racing go carts.  Has not been able to engage  in these hobbies since the death of his son. Denies prior psychiatric hospitalizations. Denies SI attempts, current or history of plan or intent to harm himself.  Denies HI, AVH. +FH of AUD in son who passed away due to alcohol related health issues.     Denies significant PMH. Currently does not have a PCP.  Denies Hep C/HIV.  Denies DT/seizure history.    Currently lives in alone in [place]. Moved back to the area from [place] when he and his wife  5/2024. He would like to repair the relationship. Has 1 living child and granddaughter that are in [place]. Son passed away at age 36. Highest level of education completed was some college. Not currently employed. 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 Thinking about getting part-time job when he get mental health issues taken care of. License is active and has a vehicle. Denies current legal issues related to use. DUI in 2018. Currently does not have health insurance. Does not qualify for medicaid due to income. Will be able to get on medicare 8/2025.    Triggers: Habit, hard emotions, grief    Cravings: Multiple times daily for alcohol.  Using dreams as well.    Relapse Prevention: MAT for AUD, psych med management, peer support, recovery meetings    Urine Drug Screen (today's visit) discussed: 1/9/2025 positive ethanol    UDS Confirmation: N/a    JAVED (PDMP) Reviewed for Current/Active Medications: No active medications    DSM 5 Alcohol Use Disorder Checklist     Diagnostic Criteria   (Substance use disorder requires at least 2 criteria be met within 12 month period)   Meets Criteria          Yes / No  Notes/Supporting Information    Substance often taken in larger amounts or over a longer period than intended.  yes    2.  There is a persistent desire or unsuccessful effort to cut        down or control th substance use.  yes    3.  A great deal of time is spent in activities necessary to        obtain the substance, use the substance, or  recover        from its effects.  yes    4.  Craving or a strong desire to use the substance.  yes    5.  Recurrent substance use resulting in failure to fulfill        major role obligations at work, school, or home.  yes    6.  Continued substance use despite having persistent or         recurrent social or interpersonal problems caused or         exacerbated by the effects of the substance.  yes    7.   Important social, occupational or recreational activities        are given up or reduced because of substance use.  yes    8.   Recurrent substance use in situations in which it is        physically hazardous.  no    9.  Continued use despite knowledge of having a         persistent or recurrent physical or psychological         problem that is likely to have been caused or        exacerbated by the substance.  yes    10. * Tolerance, as defined by either of the following:          a. A need for markedly increased amounts of the              Substance to achieve intoxication or desired effect.          b. Markedly diminished effect with continued use of              The same substance.  yes    11.  * Withdrawal, as manifested by either of the following:         a. The characteristic withdrawal for the substance.          b. The same (or closely related) substance is taken to               Relieve or avoid withdrawal symptoms.  no    **This criterion is not considered to be met for those individuals taking prescriptions opiates solely under medical supervision. **   Severity: Mild: 2-3 symptoms, Moderate: 4-5 symptoms, Severe: 6 or more symptoms.          Past Surgical History:  History reviewed. No pertinent surgical history.    Problem List:  There is no problem list on file for this patient.      Allergy:   Allergies   Allergen Reactions    Penicillins Swelling and Other (See Comments)     Unknown reaction        Current Medications:   Current Outpatient Medications   Medication Sig Dispense Refill    FLUoxetine  (PROzac) 20 MG capsule Take 1 capsule by mouth Daily. 30 capsule 0    naltrexone (DEPADE) 50 MG tablet Take 1 tablet by mouth Daily. 30 tablet 0     No current facility-administered medications for this visit.       Past Medical History:  Past Medical History:   Diagnosis Date    Hyperlipidemia        Social History:  Social History     Socioeconomic History    Marital status:    Tobacco Use    Smoking status: Never   Vaping Use    Vaping status: Never Used   Substance and Sexual Activity    Alcohol use: Not Currently     Alcohol/week: 50.0 standard drinks of alcohol     Types: 50 Glasses of wine per week     Comment: Last thursday stopped-drinks 6-12 beers daily    Drug use: Never    Sexual activity: Defer       Family History:  History reviewed. No pertinent family history.      Subjective      Review of Systems:   Review of Systems   Constitutional:  Positive for fatigue. Negative for chills and fever.   Respiratory:  Negative for shortness of breath.    Cardiovascular:  Negative for chest pain.   Gastrointestinal:  Negative for abdominal pain.   Skin:  Negative for skin lesions.   Neurological:  Negative for seizures and confusion.   Psychiatric/Behavioral:  Positive for decreased concentration, sleep disturbance, depressed mood and stress. Negative for hallucinations and suicidal ideas. The patient is nervous/anxious.        PHQ-9 Depression Screening  Little interest or pleasure in doing things? Almost all   Feeling down, depressed, or hopeless? Almost all   Trouble falling or staying asleep, or sleeping too much? Almost all   Feeling tired or having little energy? Almost all   Poor appetite or overeating? Over half   Feeling bad about yourself - or that you are a failure or have let yourself or your family down? Almost all   Trouble concentrating on things, such as reading the newspaper or watching television? Over half   Moving or speaking so slowly that other people could have noticed? Or the  opposite - being so fidgety or restless that you have been moving around a lot more than usual? Over half   Thoughts that you would be better off dead, or of hurting yourself in some way? Over half   PHQ-9 Total Score 23   If you checked off any problems, how difficult have these problems made it for you to do your work, take care of things at home, or get along with other people? Somewhat difficult      [place] Suicide Severity Rating (C-SSRS)  In the past month, have you wished you were dead or wished you could go to sleep and not wake up? Yes     In the past month, have you actually had any thoughts of killing yourself? No     Have you been thinking about how you might do this?       Have you had these thoughts and had some intention of acting on them?       Have you started to work out or have you worked out the details of how to kill yourself?       Have you ever in your lifetime done anything, started to do anything, or prepared to do anything to end your life? No       Was this within the past three months?       Level of Risk per Screen Low Risk     TRAY-7 Score:   Feeling nervous, anxious or on edge: More than half the days  Not being able to stop or control worrying: Nearly every day  Trouble Relaxing: More than half the days  Being so restless that it is hard to sit still: More than half the days  Feeling afraid as if something awful might happen: Nearly every day  Becoming easily annoyed or irritable: More than half the days  TRAY 7 Total Score: 14  If you checked any problems, how difficult have these problems made it for you to do your work, take care of things at home, or get along with other people: Somewhat difficult    Patient History:   The following portions of the patient's history were reviewed and updated as appropriate: allergies, current medications, past family history, past medical history, past social history, past surgical history and problem list.     Social:  Social History  "    Socioeconomic History    Marital status:    Tobacco Use    Smoking status: Never   Vaping Use    Vaping status: Never Used   Substance and Sexual Activity    Alcohol use: Not Currently     Alcohol/week: 50.0 standard drinks of alcohol     Types: 50 Glasses of wine per week     Comment: Last thursday stopped-drinks 6-12 beers daily    Drug use: Never    Sexual activity: Defer       Medications:     Current Outpatient Medications:     FLUoxetine (PROzac) 20 MG capsule, Take 1 capsule by mouth Daily., Disp: 30 capsule, Rfl: 0    naltrexone (DEPADE) 50 MG tablet, Take 1 tablet by mouth Daily., Disp: 30 tablet, Rfl: 0    Objective     Physical Exam  Vitals reviewed.   Constitutional:       General: He is not in acute distress.     Appearance: He is well-developed. He is not ill-appearing.   Pulmonary:      Effort: No respiratory distress.   Neurological:      Mental Status: He is alert and oriented to person, place, and time.      Gait: Gait normal.   Psychiatric:         Attention and Perception: Attention normal.         Mood and Affect: Affect normal.         Speech: Speech normal.         Behavior: Behavior normal. Behavior is cooperative.         Thought Content: Thought content is not paranoid or delusional. Thought content does not include homicidal or suicidal ideation. Thought content does not include homicidal or suicidal plan.         Cognition and Memory: Cognition and memory normal.         Vital Signs:   Vitals:    01/14/25 0935   BP: 140/88   Pulse: 78   SpO2: 99%   Weight: 112 kg (248 lb)   Height: 180.3 cm (71\")     Body mass index is 34.59 kg/m².     Mental Status Exam:   Hygiene:   good  Cooperation:  Cooperative  Eye Contact:  Good  Psychomotor Behavior:  Appropriate  Affect:  Full range  Mood: normal and sad  Speech:  Normal  Thought Process:  Goal directed  Thought Content:  Normal  Suicidal:  Death wish  Homicidal:  None  Hallucinations:  None  Delusion:  None  Memory:  " Intact  Orientation:  Person, Place, Time, and Situation  Reliability:  good  Insight:  Fair  Judgement:  Fair  Impulse Control:  Fair    Assessment / Plan      -Discussed MAT treatment options. Patient desires to start medication assisted treatment. Patient has agreed to participate in all aspects of treatment including follow-up appointments, counseling, drug testing, lab work, and other requirements.    -Start naltrexone 25 mg daily x 1 week then increase to 50 mg daily, if needed.  Denies any opioid/kratom use.  LFTs WNL 1/9/2025.  Will call if he is unable to pay cash pay price.  Should be around $30.  -Advisement to take part in and remain active in 12 Step Recovery Meetings, IOP, and/or 1:1 therapy/counseling and to establish/maintain an active relationship with a recovery sponsor. Recovery meeting list for [facility] given.  Encouraged him to attend at least 2 or 3 recovery meetings weekly.  May consider individual therapy/CD-IOP in the future.  Currently does not have insurance.  -Based on mood symptoms reported today we will start fluoxetine 20 mg every morning for MDD, TRAY.  Discussed adverse effects of medication when to call/RTC.  Medication is on the $4 list at [facility].  -FRANCISCA signed for [facility].  Will have peer support and TCM reach out.      Assessment & Plan   Problems Addressed this Visit    None  Visit Diagnoses       Generalized anxiety disorder    -  Primary    Relevant Medications    FLUoxetine (PROzac) 20 MG capsule    Alcohol use disorder, severe, dependence        Relevant Medications    naltrexone (DEPADE) 50 MG tablet    Severe episode of recurrent major depressive disorder, without psychotic features        Relevant Medications    FLUoxetine (PROzac) 20 MG capsule          Diagnoses         Codes Comments    Generalized anxiety disorder    -  Primary ICD-10-CM: F41.1  ICD-9-CM: 300.02     Alcohol use disorder, severe, dependence     ICD-10-CM: F10.20  ICD-9-CM: 303.90     Severe  episode of recurrent major depressive disorder, without psychotic features     ICD-10-CM: F33.2  ICD-9-CM: 296.33             Visit Diagnoses:    ICD-10-CM ICD-9-CM   1. Generalized anxiety disorder  F41.1 300.02   2. Alcohol use disorder, severe, dependence  F10.20 303.90   3. Severe episode of recurrent major depressive disorder, without psychotic features  F33.2 296.33       PLAN:  Safety: No acute safety concerns  Risk Assessment: Risk of self-harm acutely is low. Risk of self-harm chronically is also low, but could be further elevated in the event of treatment noncompliance and/or AODA.    TREATMENT PLAN: Continue supportive psychotherapy efforts and medications as indicated. Treatment and medication options discussed during today's visit. Patient acknowledged and verbally consented to continue with current treatment plan and was educated on the importance of compliance with treatment and follow-up appointments.    Patient screened positive for depression based on a PHQ-9 score of 23 on 1/14/2025. Follow-up recommendations include: Prescribed antidepressant medication treatment and Suicide Risk Assessment performed.    GOALS:  Short Term Goals: Patient will be compliant with medication, and patient will have no significant medication related side effects.  Patient will be engaged in psychotherapy as indicated.  Patient will report subjective improvement of symptoms.  Long term goals: To stabilize mood and treat/improve subjective symptoms, the patient will stay out of the hospital, the patient will be at an optimal level of functioning, and the patient will take all medications as prescribed.  The patient/guardian verbalized understanding and agreement with goals that were mutually set.    MEDICATION ISSUES:  JAVED reviewed as expected.  Discussed medication options and treatment plan of prescribed medication as well as the risks, benefits, and side effects including potential falls, possible impaired driving  and metabolic adversities among others. Patient is agreeable to call the office with any worsening of symptoms or onset of side effects. Patient is agreeable to call 911 or go to the nearest ER should he/she begin having SI/HI. No medication side effects or related complaints today.       TOBACCO USE:  Never smoker    I advised Bethel Velazquez of the risks of tobacco use.     MEDS ORDERED DURING VISIT:  New Medications Ordered This Visit   Medications    FLUoxetine (PROzac) 20 MG capsule     Sig: Take 1 capsule by mouth Daily.     Dispense:  30 capsule     Refill:  0     Cash pay    naltrexone (DEPADE) 50 MG tablet     Sig: Take 1 tablet by mouth Daily.     Dispense:  30 tablet     Refill:  0     Cash pay       Return in about 4 weeks (around 2/11/2025) for Follow Up Medication.                 This document has been electronically signed by GENEVIEVE Lee  January 14, 2025 12:23 EST      Part of this note may be an electronic transcription/translation of spoken language to printed text using the Dragon Dictation System.

## 2025-01-14 NOTE — TELEPHONE ENCOUNTER
Rx Refill Note  Requested Prescriptions     Pending Prescriptions Disp Refills    FLUoxetine (PROzac) 20 MG capsule 30 capsule 0     Sig: Take 1 capsule by mouth Daily.    naltrexone (DEPADE) 50 MG tablet 30 tablet 0     Sig: Take 1 tablet by mouth Daily.      Last office visit with prescribing clinician: 1/14/2025   Last telemedicine visit with prescribing clinician: Visit date not found   Next office visit with prescribing clinician: 2/11/2025                         Would you like a call back once the refill request has been completed: [] Yes [] No    If the office needs to give you a call back, can they leave a voicemail: [] Yes [] No    Maricarmen Latham, CHEPE  01/14/25, 12:52 EST

## 2025-01-14 NOTE — PATIENT INSTRUCTIONS
Major Depressive Disorder, Adult  Major depressive disorder (MDD) is a mental health condition. It may also be called clinical depression or unipolar depression. MDD causes symptoms of sadness, hopelessness, and loss of interest in things. These symptoms last most of the day, almost every day, for 2 weeks. MDD can also cause physical symptoms. It can interfere with relationships and activities, such as work, school, and activities that are usually pleasant.  MDD may be mild, moderate, or severe. It may be single-episode MDD, which happens once, or recurrent MDD, which may occur many times.  What are the causes?  The exact cause of this condition is not known.  What increases the risk?  The following factors may make someone more likely to develop MDD:  A family history of depression.  Being female.  Long-term (chronic) stress, physical illness, other mental health disorders, or substance misuse.  Trauma, including:  Family problems.  Violence or abuse.  Loss of a parent or close family member.  Experiencing discrimination.  What are the signs or symptoms?  The main symptoms of MDD usually include:  Constant depressed or irritable mood.  A loss of interest in activities.  Sleeping or eating too much or too little.  Tiredness or low energy.  Other symptoms include:  Unexplained weight gain or weight loss.  Being agitated, restless, or weak.  Feeling hopeless, worthless, or guilty.  Trouble thinking clearly or making decisions.  Thoughts of suicide or harming others.  Spending a lot of time alone.  Not being able to complete daily tasks or work.  Severe symptoms of this condition may include:  Psychotic depression.This may include false beliefs or delusions. It may also include seeing, hearing, tasting, smelling, or feeling things that are not real (hallucinations).  Chronic depression or persistent depressive disorder. This is low-level depression that lasts for at least 2 years.  Melancholic depression, or feeling  extremely sad and hopeless.  Catatonic depression, which includes trouble speaking and trouble moving.  Seasonal depression, which is caused by changes in the seasons.  How is this diagnosed?  This condition may be diagnosed based on:  Your symptoms.  Your medical and mental health history.  A physical exam.  Blood tests to rule out other conditions.  MDD is confirmed if you have either a depressed mood or loss of interest and at least four other MDD symptoms, most of the day, nearly every day, in a 2-week period.  How is this treated?  This condition is usually treated by mental health professionals, such as psychologists, psychiatrists, and clinical social workers. You may need more than one type of treatment. Treatment may include:  Psychotherapy, also called talk therapy or counseling. Types of psychotherapy include:  Cognitive behavioral therapy (CBT). This teaches you to recognize unhealthy feelings, thoughts, and behaviors, and replace them with positive thoughts and actions.  Interpersonal therapy (IPT). This helps you to improve the way you communicate with others or relate to them.  Family therapy. This treatment includes members of your family.  Medicines to treat anxiety and depression. These medicines help to balance the brain chemicals that affect your emotions.  Lifestyle changes. You may be asked to:  Limit alcohol use and avoid drug use.  Get regular exercise.  Get plenty of sleep.  Make healthy eating choices.  Spend more time outdoors.  Brain stimulation. This may be done if symptoms are very severe and other treatments have not worked. Examples of this treatment are electroconvulsive therapy and transcranial magnetic stimulation.  Follow these instructions at home:  Alcohol use  Do not drink alcohol if:  Your health care provider tells you not to drink.  You are pregnant, may be pregnant, or are planning to become pregnant.  If you drink alcohol:  Limit how much you have to:  0-1 drink a day for  women  0-2 drinks a day for men.  Know how much alcohol is in your drink. In the [place] one drink equals one 12 oz bottle of beer (355 mL), one 5 oz glass of wine (148 mL), or one 1½ oz glass of hard liquor (44 mL).  Activity  Exercise regularly and spend time outdoors.  Find activities that you enjoy and make time to do them.  Find healthy ways to manage stress, such as:  Meditation or deep breathing.  Spending time in nature.  Journaling.  Return to your normal activities as told by your health care provider. Ask your health care provider what activities are safe for you.  General instructions    Take over-the-counter and prescription medicines only as told by your health care provider.  Discuss alcohol use with your health care provider. Alcohol can affect any antidepressant medicines you are taking.  Discuss any drug use with your health care provider.  Eat a healthy diet and get enough sleep.  Consider joining a support group. Your health care provider may be able to recommend one.  Keep all follow-up visits. It is important for your health care provider to check on your mood, behavior, and medicines. Your health care provider will make changes to your treatment as needed.  Where to find more information  National [place] on Mental Illness: elizbaeth.org  National [place] of Mental Health: nimh.nih.gov  American Psychiatric Association: psychiatry.org  Contact a health care provider if:  Your symptoms get worse.  You develop new symptoms.  Get help right away if:  You hurt yourself on purpose (self-harm).  You have thoughts about hurting yourself or others.  You have hallucinations.  Get help right away if you feel like you may hurt yourself or others, or have thoughts about taking your own life. Go to your nearest emergency room or:  Call 911.  Call the National Suicide Prevention Lifeline at 1-665.838.8699 or 160. This is open 24 hours a day.  Text the Crisis Text Line at 708829.  This information is not  intended to replace advice given to you by your health care provider. Make sure you discuss any questions you have with your health care provider.  Document Revised: 04/25/2023 Document Reviewed: 04/25/2023  Elsevier Patient Education © 2024 [employer].

## 2025-01-14 NOTE — TELEPHONE ENCOUNTER
Pt called in saying that [facility] wont fill his meds due to insurance issues and wants to know if we can send it to [facility]

## 2025-07-22 ENCOUNTER — TELEPHONE (OUTPATIENT)
Dept: PSYCHIATRY | Facility: CLINIC | Age: 65
End: 2025-07-22

## 2025-07-22 NOTE — TELEPHONE ENCOUNTER
I attempted to reach patient earlier by phone to confirm his 1pm appointment with [facility] today. Left a vm to call back to let us know. Patient called back during lunch and left a vm letting us know that he would not be able to make it. He reported in the message that he has been drinking and was not going to drive under the influence.

## 2025-07-24 ENCOUNTER — TELEPHONE (OUTPATIENT)
Dept: PSYCHIATRY | Facility: CLINIC | Age: 65
End: 2025-07-24

## 2025-07-24 ENCOUNTER — OFFICE VISIT (OUTPATIENT)
Dept: PSYCHIATRY | Facility: CLINIC | Age: 65
End: 2025-07-24
Payer: MEDICAID

## 2025-07-24 VITALS
DIASTOLIC BLOOD PRESSURE: 90 MMHG | HEIGHT: 71 IN | HEART RATE: 104 BPM | BODY MASS INDEX: 35.17 KG/M2 | WEIGHT: 251.2 LBS | OXYGEN SATURATION: 97 % | SYSTOLIC BLOOD PRESSURE: 142 MMHG

## 2025-07-24 DIAGNOSIS — F41.1 GENERALIZED ANXIETY DISORDER: ICD-10-CM

## 2025-07-24 DIAGNOSIS — F10.20 ALCOHOL USE DISORDER, SEVERE, DEPENDENCE: ICD-10-CM

## 2025-07-24 DIAGNOSIS — F33.2 SEVERE EPISODE OF RECURRENT MAJOR DEPRESSIVE DISORDER, WITHOUT PSYCHOTIC FEATURES: ICD-10-CM

## 2025-07-24 RX ORDER — NALTREXONE HYDROCHLORIDE 50 MG/1
50 TABLET, FILM COATED ORAL DAILY
Qty: 30 TABLET | Refills: 0 | Status: SHIPPED | OUTPATIENT
Start: 2025-07-24

## 2025-07-24 RX ORDER — HYDROXYZINE PAMOATE 25 MG/1
CAPSULE ORAL
Qty: 60 CAPSULE | Refills: 0 | Status: SHIPPED | OUTPATIENT
Start: 2025-07-24

## 2025-07-24 NOTE — PATIENT INSTRUCTIONS
Major Depressive Disorder, Adult  Major depressive disorder (MDD) is a mental health condition. It may also be called clinical depression or unipolar depression. MDD causes symptoms of sadness, hopelessness, and loss of interest in things. These symptoms last most of the day, almost every day, for 2 weeks. MDD can also cause physical symptoms. It can interfere with relationships and activities, such as work, school, and activities that are usually pleasant.  MDD may be mild, moderate, or severe. It may be single-episode MDD, which happens once, or recurrent MDD, which may occur many times.  What are the causes?  The exact cause of this condition is not known.  What increases the risk?  The following factors may make someone more likely to develop MDD:  A family history of depression.  Being female.  Long-term (chronic) stress, physical illness, other mental health disorders, or substance misuse.  Trauma, including:  Family problems.  Violence or abuse.  Loss of a parent or close family member.  Experiencing discrimination.  What are the signs or symptoms?  The main symptoms of MDD usually include:  Constant depressed or irritable mood.  A loss of interest in activities.  Sleeping or eating too much or too little.  Tiredness or low energy.  Other symptoms include:  Unexplained weight gain or weight loss.  Being agitated, restless, or weak.  Feeling hopeless, worthless, or guilty.  Trouble thinking clearly or making decisions.  Thoughts of suicide or harming others.  Spending a lot of time alone.  Not being able to complete daily tasks or work.  Severe symptoms of this condition may include:  Psychotic depression.This may include false beliefs or delusions. It may also include seeing, hearing, tasting, smelling, or feeling things that are not real (hallucinations).  Chronic depression or persistent depressive disorder. This is low-level depression that lasts for at least 2 years.  Melancholic depression, or feeling  extremely sad and hopeless.  Catatonic depression, which includes trouble speaking and trouble moving.  Seasonal depression, which is caused by changes in the seasons.  How is this diagnosed?  This condition may be diagnosed based on:  Your symptoms.  Your medical and mental health history.  A physical exam.  Blood tests to rule out other conditions.  MDD is confirmed if you have either a depressed mood or loss of interest and at least four other MDD symptoms, most of the day, nearly every day, in a 2-week period.  How is this treated?  This condition is usually treated by mental health professionals, such as psychologists, psychiatrists, and clinical social workers. You may need more than one type of treatment. Treatment may include:  Psychotherapy, also called talk therapy or counseling. Types of psychotherapy include:  Cognitive behavioral therapy (CBT). This teaches you to recognize unhealthy feelings, thoughts, and behaviors, and replace them with positive thoughts and actions.  Interpersonal therapy (IPT). This helps you to improve the way you communicate with others or relate to them.  Family therapy. This treatment includes members of your family.  Medicines to treat anxiety and depression. These medicines help to balance the brain chemicals that affect your emotions.  Lifestyle changes. You may be asked to:  Limit alcohol use and avoid drug use.  Get regular exercise.  Get plenty of sleep.  Make healthy eating choices.  Spend more time outdoors.  Brain stimulation. This may be done if symptoms are very severe and other treatments have not worked. Examples of this treatment are electroconvulsive therapy and transcranial magnetic stimulation.  Follow these instructions at home:  Alcohol use  Do not drink alcohol if:  Your health care provider tells you not to drink.  You are pregnant, may be pregnant, or are planning to become pregnant.  If you drink alcohol:  Limit how much you have to:  0-1 drink a day for  women  0-2 drinks a day for men.  Know how much alcohol is in your drink. In the [place] one drink equals one 12 oz bottle of beer (355 mL), one 5 oz glass of wine (148 mL), or one 1½ oz glass of hard liquor (44 mL).  Activity  Exercise regularly and spend time outdoors.  Find activities that you enjoy and make time to do them.  Find healthy ways to manage stress, such as:  Meditation or deep breathing.  Spending time in nature.  Journaling.  Return to your normal activities as told by your health care provider. Ask your health care provider what activities are safe for you.  General instructions    Take over-the-counter and prescription medicines only as told by your health care provider.  Discuss alcohol use with your health care provider. Alcohol can affect any antidepressant medicines you are taking.  Discuss any drug use with your health care provider.  Eat a healthy diet and get enough sleep.  Consider joining a support group. Your health care provider may be able to recommend one.  Keep all follow-up visits. It is important for your health care provider to check on your mood, behavior, and medicines. Your health care provider will make changes to your treatment as needed.  Where to find more information  National [place] on Mental Illness: elizabeth.org  National [place] of Mental Health: nimh.nih.gov  American Psychiatric Association: psychiatry.org  Contact a health care provider if:  Your symptoms get worse.  You develop new symptoms.  Get help right away if:  You hurt yourself on purpose (self-harm).  You have thoughts about hurting yourself or others.  You have hallucinations.  Get help right away if you feel like you may hurt yourself or others, or have thoughts about taking your own life. Go to your nearest emergency room or:  Call 911.  Call the National Suicide Prevention Lifeline at 1-177.201.9793 or 130. This is open 24 hours a day.  Text the Crisis Text Line at 790815.  This information is not  intended to replace advice given to you by your health care provider. Make sure you discuss any questions you have with your health care provider.  Document Revised: 04/25/2023 Document Reviewed: 04/25/2023  [employer]vier Patient Education © 2024 [employer]

## 2025-07-24 NOTE — TELEPHONE ENCOUNTER
During check in today, I asked patient if he had active insurance. Patient stated that he does not at the moment but he will have insurance beginning 08/01/25.

## 2025-07-24 NOTE — PROGRESS NOTES
New Patient Office Visit        Patient Name: Bethel Velazquez  : 1960   MRN: [id number]     Referring Provider: Provider, No Known    Chief Complaint: Substance use and mental health    History of Present Illness:   Bethel Velazquez is a 64 y.o. male who is here today for follow-up on alcohol use and mood.  Was able to maintain his sobriety for a period of time.  Ran out of fluoxetine and naltrexone.  Was feeling better and felt like he did not need the medication.  Unfortunately, mood started to decline again and use recurred.  Has been struggling with worsening depression and alcohol use since.  Last intake was 3 beers on Monday.  Not having any withdrawal symptoms today.  Has had some suicidal thoughts since our last visit.  He denies any current plan or intent to harm himself and can cite many protective factors.  Wants to restart on medication and get more actively engaged in his recovery care.  Tried going to recovery meetings.  Found them to be too cheryl-based for him to connect 2.  He denies any SI/HI, AVH today.    Triggers: Habit, hard emotions, grief    Cravings: Multiple times daily for alcohol.  Using dreams as well.    Relapse Prevention: MAT for AUD, psych med management, peer support, recovery meetings    Urine Drug Screen (today's visit) discussed: Ordered for CD-IOP    UDS Confirmation: 2025 positive ethanol    JAVED (PDMP) Reviewed for Current/Active Medications: No active medications    History from 2025 reviewed and updated:  Bethel Velazquez is a 64 y.o. male who is here today for initial evaluation with provider related to substance use and mental health.  Alcohol use and initially started in high school with social and seldom use.  After the death of his son in , use began to increase and be more regular.  Continued to increase over time and reached his peak 2024.  Was drinking 12 pack of beer most to all nights of the week.  Cut back some in recent past and has been  drinking 6-12 beers most nights a week.  Brought to ER on 1/9/2025 by ROBEL as he had made some concerning statements [facility].  Has not had any alcohol intake since.  Denies any other substance use.  Cravings for alcohol are multiple times daily.  Also having dreams about drinking at night.  Typically triggered by habit, hard emotions, and grief.  Has been in AA in the past.  No other previous FLORES/MAT. Identifies sober support system of his mother, brother, and sister-in-law.      Denies psych history.  Feels he has been struggling with mood off and on for at least the last few years.  Today reports symptoms of depressed mood overall, lack of motivation/interest, feelings of guilt, low energy, poor concentration, appetites waxes and wanes, sleep difficulties, and psychomotor retardation at times. Admits thoughts that he would be better off dead or wishes he doesn't wake up. Denies any plan or intent to harm himself. Also struggles with anxious mood overall, worries all the time, feels on edge/irritable, racing thoughts, trouble relaxing, and restlessness.  Finds most problematic symptom to be crying spells.  No previous pharmacological intervention in the past.  Previously enjoyed working on cars and racing go carts.  Has not been able to engage in these hobbies since the death of his son. Denies prior psychiatric hospitalizations. Denies SI attempts, current or history of plan or intent to harm himself.  Denies HI, AVH. +FH of AUD in son who passed away due to alcohol related health issues.     Denies significant PMH. Currently does not have a PCP.  Denies Hep C/HIV.  Denies DT/seizure history.    Currently lives in alone in [place]. Moved back to the area from [place] when he and his wife  5/2024. He would like to repair the relationship. Has 1 living child and granddaughter that are in [place]. Son passed away at age 36. Highest level of education completed was some college. Not currently employed.  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 Thinking about getting part-time job when he get mental health issues taken care of. License is active and has a vehicle. Denies current legal issues related to use. DUI in 2018. Currently does not have health insurance. Does not qualify for medicaid due to income. Will be able to get on medicare 8/2025.    DSM 5 Alcohol Use Disorder Checklist: Reviewed 7/24/2025    Diagnostic Criteria   (Substance use disorder requires at least 2 criteria be met within 12 month period)   Meets Criteria          Yes / No  Notes/Supporting Information    Substance often taken in larger amounts or over a longer period than intended.  yes    2.  There is a persistent desire or unsuccessful effort to cut        down or control th substance use.  yes    3.  A great deal of time is spent in activities necessary to        obtain the substance, use the substance, or recover        from its effects.  yes    4.  Craving or a strong desire to use the substance.  yes    5.  Recurrent substance use resulting in failure to fulfill        major role obligations at work, school, or home.  yes    6.  Continued substance use despite having persistent or         recurrent social or interpersonal problems caused or         exacerbated by the effects of the substance.  yes    7.   Important social, occupational or recreational activities        are given up or reduced because of substance use.  yes    8.   Recurrent substance use in situations in which it is        physically hazardous.  no    9.  Continued use despite knowledge of having a         persistent or recurrent physical or psychological         problem that is likely to have been caused or        exacerbated by the substance.  yes    10. * Tolerance, as defined by either of the following:          a. A need for markedly increased amounts of the              Substance to achieve intoxication or desired effect.          b. Markedly  diminished effect with continued use of              The same substance.  yes    11.  * Withdrawal, as manifested by either of the following:         a. The characteristic withdrawal for the substance.          b. The same (or closely related) substance is taken to               Relieve or avoid withdrawal symptoms.  no    **This criterion is not considered to be met for those individuals taking prescriptions opiates solely under medical supervision. **   Severity: Mild: 2-3 symptoms, Moderate: 4-5 symptoms, Severe: 6 or more symptoms.      ASAM Criteria:  Dimension 1 Score: 1 - minimal risk of severe withdrawal, on MAT  Dimension 2 Score: 0 - biomedical conditions do not increase risk  Dimension 3 Score: 2 - major depressive disorder currently active, recent SI, history of noncompliance  Dimension 4 Score: 2 - routine use of alcohol, moderate risk of destabilization, history of noncompliance with medications  Dimension 5 Score: 2 - living alone, easy access to alcohol  Dimension 6 Score: 0 -realistic and positive expectations of treatment  ASAM Criteria Total Score: 7      Past Surgical History:  History reviewed. No pertinent surgical history.    Problem List:  There is no problem list on file for this patient.      Allergy:   Allergies   Allergen Reactions    Penicillins Swelling and Other (See Comments)     Unknown reaction        Current Medications:   Current Outpatient Medications   Medication Sig Dispense Refill    FLUoxetine (PROzac) 20 MG capsule Take 1 capsule by mouth Daily. 30 capsule 0    naltrexone (DEPADE) 50 MG tablet Take 1 tablet by mouth Daily. 30 tablet 0    hydrOXYzine pamoate (Vistaril) 25 MG capsule 1-2 nightly as needed for anxiety and sleep 60 capsule 0     No current facility-administered medications for this visit.       Past Medical History:  Past Medical History:   Diagnosis Date    Hyperlipidemia        Social History:  Social History     Socioeconomic History    Marital status:     Tobacco Use    Smoking status: Never   Vaping Use    Vaping status: Never Used   Substance and Sexual Activity    Alcohol use: Yes     Alcohol/week: 50.0 standard drinks of alcohol     Types: 50 Glasses of wine per week     Comment: 3 beers on Monday    Drug use: Never    Sexual activity: Defer       Family History:  History reviewed. No pertinent family history.      Subjective      Review of Systems:   Review of Systems   Constitutional:  Positive for fatigue. Negative for chills and fever.   Respiratory:  Negative for shortness of breath.    Cardiovascular:  Negative for chest pain.   Gastrointestinal:  Negative for abdominal pain.   Skin:  Negative for skin lesions.   Neurological:  Negative for seizures and confusion.   Psychiatric/Behavioral:  Positive for decreased concentration, sleep disturbance, depressed mood and stress. Negative for hallucinations and suicidal ideas. The patient is nervous/anxious.        PHQ-9 Depression Screening  Little interest or pleasure in doing things? Almost all   Feeling down, depressed, or hopeless? Almost all   Trouble falling or staying asleep, or sleeping too much? Almost all   Feeling tired or having little energy? Almost all   Poor appetite or overeating? Over half   Feeling bad about yourself - or that you are a failure or have let yourself or your family down? Almost all   Trouble concentrating on things, such as reading the newspaper or watching television? Over half   Moving or speaking so slowly that other people could have noticed? Or the opposite - being so fidgety or restless that you have been moving around a lot more than usual? Over half   Thoughts that you would be better off dead, or of hurting yourself in some way? Over half   PHQ-9 Total Score 23   If you checked off any problems, how difficult have these problems made it for you to do your work, take care of things at home, or get along with other people? Very difficult      [place] Suicide  Severity Rating (C-SSRS)  In the past month, have you wished you were dead or wished you could go to sleep and not wake up? Yes     In the past month, have you actually had any thoughts of killing yourself? No     Have you been thinking about how you might do this?       Have you had these thoughts and had some intention of acting on them?       Have you started to work out or have you worked out the details of how to kill yourself?       Have you ever in your lifetime done anything, started to do anything, or prepared to do anything to end your life? No       Was this within the past three months?       Level of Risk per Screen Low Risk     TRAY-7 Score:   Feeling nervous, anxious or on edge: Several days  Not being able to stop or control worrying: Nearly every day  Worrying too much about different things: Nearly every day  Trouble Relaxing: Nearly every day  Being so restless that it is hard to sit still: More than half the days  Feeling afraid as if something awful might happen: Nearly every day  Becoming easily annoyed or irritable: More than half the days  TRAY 7 Total Score: 17  If you checked any problems, how difficult have these problems made it for you to do your work, take care of things at home, or get along with other people: Very difficult    Patient History:   The following portions of the patient's history were reviewed and updated as appropriate: allergies, current medications, past family history, past medical history, past social history, past surgical history and problem list.     Social:  Social History     Socioeconomic History    Marital status:    Tobacco Use    Smoking status: Never   Vaping Use    Vaping status: Never Used   Substance and Sexual Activity    Alcohol use: Yes     Alcohol/week: 50.0 standard drinks of alcohol     Types: 50 Glasses of wine per week     Comment: 3 beers on Monday    Drug use: Never    Sexual activity: Defer       Medications:     Current Outpatient  "Medications:     FLUoxetine (PROzac) 20 MG capsule, Take 1 capsule by mouth Daily., Disp: 30 capsule, Rfl: 0    naltrexone (DEPADE) 50 MG tablet, Take 1 tablet by mouth Daily., Disp: 30 tablet, Rfl: 0    hydrOXYzine pamoate (Vistaril) 25 MG capsule, 1-2 nightly as needed for anxiety and sleep, Disp: 60 capsule, Rfl: 0    Objective     Physical Exam  Vitals reviewed.   Constitutional:       General: He is not in acute distress.     Appearance: He is well-developed. He is not ill-appearing.   Pulmonary:      Effort: No respiratory distress.   Neurological:      Mental Status: He is alert and oriented to person, place, and time.      Gait: Gait normal.   Psychiatric:         Attention and Perception: Attention normal.         Mood and Affect: Affect normal.         Speech: Speech normal.         Behavior: Behavior normal. Behavior is cooperative.         Thought Content: Thought content is not paranoid or delusional. Thought content does not include homicidal or suicidal ideation. Thought content does not include homicidal or suicidal plan.         Cognition and Memory: Cognition and memory normal.         Vital Signs:   Vitals:    07/24/25 1255   BP: 142/90   Pulse: 104   SpO2: 97%   Weight: 114 kg (251 lb 3.2 oz)   Height: 180.3 cm (71\")     Body mass index is 35.04 kg/m².     Mental Status Exam: Reviewed 7/24/2025  Hygiene:   good  Cooperation:  Cooperative  Eye Contact:  Good  Psychomotor Behavior:  Appropriate  Affect:  Full range  Mood: normal and sad  Speech:  Normal  Thought Process:  Goal directed  Thought Content:  Normal  Suicidal:  Death wish  Homicidal:  None  Hallucinations:  None  Delusion:  None  Memory:  Intact  Orientation:  Person, Place, Time, and Situation  Reliability:  good  Insight:  Fair  Judgement:  Fair  Impulse Control:  Fair    Assessment / Plan      -This is my follow up evaluation with Kyle.  He has struggled to maintain his sobriety on his own.  Based on self-reported substance use " history and current symptom burden, CD-IOP has been advised and start date of 8/4/2025 morning group given.  Has transportation.  -Encouraged to take part in and remain active in 12 Step Recovery Meetings, IOP, and/or 1:1 therapy/counseling and to establish/maintain an active relationship with a recovery sponsor as part of their long-term recovery care. Recovery meeting list for [facility] given. Discussed other recovery related materials.  [facility] recovery.  - UDS ordered for CD-IOP.  Agreeable to continued monitoring and will request confirmations with levels on all preliminary positive results for patient safety, medication compliance, adherence to treatment plan, toxicity monitoring (when applicable), and planning of future care.   -FRANCISCA signed for [facility] and referral placed for peer support.  He has already been in contact with her.  Agreeable to reach out at least twice weekly until he starts CD-IOP.  We will go to 1 meeting weekly until he starts CD-IOP.  -Narcan sample declined.  -restart fluoxetine 20 mg every morning for MDD, TRAY.  Discussed adverse effects of medication when to call/RTC.  Medication is on the $4 list at [facility].  - Start hydroxyzine pamoate 25 to 50 mg nightly as needed for anxiety and sleep.  Discussed adverse effects of medication when to call/RTC.  - Adamantly and convincingly denies any SI today.  Verbally reviewed safety plan in office today.      Assessment & Plan   Problems Addressed this Visit    None  Visit Diagnoses         Generalized anxiety disorder        Relevant Medications    FLUoxetine (PROzac) 20 MG capsule    hydrOXYzine pamoate (Vistaril) 25 MG capsule      Severe episode of recurrent major depressive disorder, without psychotic features        Relevant Medications    FLUoxetine (PROzac) 20 MG capsule    hydrOXYzine pamoate (Vistaril) 25 MG capsule      Alcohol use disorder, severe, dependence        Relevant Medications    FLUoxetine (PROzac) 20 MG capsule     naltrexone (DEPADE) 50 MG tablet    hydrOXYzine pamoate (Vistaril) 25 MG capsule          Diagnoses         Codes Comments      Generalized anxiety disorder     ICD-10-CM: F41.1  ICD-9-CM: 300.02       Severe episode of recurrent major depressive disorder, without psychotic features     ICD-10-CM: F33.2  ICD-9-CM: 296.33       Alcohol use disorder, severe, dependence     ICD-10-CM: F10.20  ICD-9-CM: 303.90             Visit Diagnoses:    ICD-10-CM ICD-9-CM   1. Generalized anxiety disorder  F41.1 300.02   2. Severe episode of recurrent major depressive disorder, without psychotic features  F33.2 296.33   3. Alcohol use disorder, severe, dependence  F10.20 303.90         PLAN:  Safety: No acute safety concerns  Risk Assessment: Risk of self-harm acutely is low. Risk of self-harm chronically is also low, but could be further elevated in the event of treatment noncompliance and/or AODA.    TREATMENT PLAN: Continue supportive psychotherapy efforts and medications as indicated. Treatment and medication options discussed during today's visit. Patient acknowledged and verbally consented to continue with current treatment plan and was educated on the importance of compliance with treatment and follow-up appointments.    Patient screened positive for depression based on a PHQ-9 score of 23 on 7/24/2025. Follow-up recommendations include: Prescribed antidepressant medication treatment and Suicide Risk Assessment performed.    GOALS:  Short Term Goals: Patient will be compliant with medication, and patient will have no significant medication related side effects.  Patient will be engaged in psychotherapy as indicated.  Patient will report subjective improvement of symptoms.  Long term goals: To stabilize mood and treat/improve subjective symptoms, the patient will stay out of the hospital, the patient will be at an optimal level of functioning, and the patient will take all medications as prescribed.  The patient/guardian  verbalized understanding and agreement with goals that were mutually set.    MEDICATION ISSUES:  JAVED reviewed as expected.  Discussed medication options and treatment plan of prescribed medication as well as the risks, benefits, and side effects including potential falls, possible impaired driving and metabolic adversities among others. Patient is agreeable to call the office with any worsening of symptoms or onset of side effects. Patient is agreeable to call 911 or go to the nearest ER should he/she begin having SI/HI. No medication side effects or related complaints today.       TOBACCO USE:  Never smoker    I advised Bethel Velazquez of the risks of tobacco use.     MEDS ORDERED DURING VISIT:  New Medications Ordered This Visit   Medications    FLUoxetine (PROzac) 20 MG capsule     Sig: Take 1 capsule by mouth Daily.     Dispense:  30 capsule     Refill:  0     Cash pay    naltrexone (DEPADE) 50 MG tablet     Sig: Take 1 tablet by mouth Daily.     Dispense:  30 tablet     Refill:  0     Cash pay    hydrOXYzine pamoate (Vistaril) 25 MG capsule     Si-2 nightly as needed for anxiety and sleep     Dispense:  60 capsule     Refill:  0     Cash pay       Return in about 4 weeks (around 2025) for Follow Up Medication.                 This document has been electronically signed by GENEVIEVE Lee  2025 14:12 EDT      Part of this note may be an electronic transcription/translation of spoken language to printed text using the Dragon Dictation System.

## 2025-07-29 ENCOUNTER — TELEPHONE (OUTPATIENT)
Dept: PSYCHIATRY | Facility: CLINIC | Age: 65
End: 2025-07-29

## 2025-07-29 DIAGNOSIS — F51.04 INSOMNIA, PSYCHOPHYSIOLOGICAL: Primary | ICD-10-CM

## 2025-07-29 RX ORDER — TRAZODONE HYDROCHLORIDE 50 MG/1
50 TABLET ORAL NIGHTLY PRN
Qty: 30 TABLET | Refills: 0 | Status: SHIPPED | OUTPATIENT
Start: 2025-07-29

## 2025-07-29 NOTE — TELEPHONE ENCOUNTER
Patient called regarding his medicine to help him sleep. I relayed the previous message from when he had talked to Zahra about taking 3 and then giving us an update on Thurs. He states that he has taken up to 4 the past two nights and it has not helped. He apologized for not letting us know that information.

## 2025-07-29 NOTE — TELEPHONE ENCOUNTER
Patient states he is taking 2 capsules nightly and reports it does not help.  Patient states he will try taking 3 and give the office an update on Thursday.

## 2025-07-29 NOTE — TELEPHONE ENCOUNTER
How many of the hydroxyzine 25 mg capsules is he taking at bedtime? Is it helping at all? He can take 3 if 2 isnt helping and he isnt have side effects.

## 2025-07-29 NOTE — TELEPHONE ENCOUNTER
Patient called in left a message that the sleep medication is not working and he needs something else.

## 2025-07-29 NOTE — TELEPHONE ENCOUNTER
Please call and let him know I have sent and trazodone 50 mg nightly to take as needed for sleep.  He can take 30 minutes before bedtime.  May cause some grogginess in the morning.  Do not drive while on this medication.  If it is causing too much daytime sedation, he can decrease to half tablet nightly.  Have him reviewed potential side effects of medication with pharmacist when he picks up.  He does need to be aware that this medication can cause a painful, prolonged erection.  It is rare, but this is a medical emergency and he needs to seek care if this happens.  Do not combine with alcohol.